# Patient Record
Sex: FEMALE | Race: WHITE | NOT HISPANIC OR LATINO
[De-identification: names, ages, dates, MRNs, and addresses within clinical notes are randomized per-mention and may not be internally consistent; named-entity substitution may affect disease eponyms.]

---

## 2017-11-27 ENCOUNTER — APPOINTMENT (OUTPATIENT)
Dept: SURGERY | Facility: CLINIC | Age: 37
End: 2017-11-27

## 2018-04-11 ENCOUNTER — APPOINTMENT (OUTPATIENT)
Dept: ORTHOPEDIC SURGERY | Facility: CLINIC | Age: 38
End: 2018-04-11

## 2021-04-29 ENCOUNTER — TRANSCRIPTION ENCOUNTER (OUTPATIENT)
Age: 41
End: 2021-04-29

## 2022-12-03 ENCOUNTER — NON-APPOINTMENT (OUTPATIENT)
Age: 42
End: 2022-12-03

## 2022-12-29 ENCOUNTER — APPOINTMENT (OUTPATIENT)
Dept: ORTHOPEDIC SURGERY | Facility: CLINIC | Age: 42
End: 2022-12-29

## 2023-02-01 ENCOUNTER — EMERGENCY (EMERGENCY)
Facility: HOSPITAL | Age: 43
LOS: 1 days | Discharge: HOME | End: 2023-02-01
Attending: STUDENT IN AN ORGANIZED HEALTH CARE EDUCATION/TRAINING PROGRAM | Admitting: STUDENT IN AN ORGANIZED HEALTH CARE EDUCATION/TRAINING PROGRAM
Payer: COMMERCIAL

## 2023-02-01 VITALS
HEIGHT: 65 IN | SYSTOLIC BLOOD PRESSURE: 138 MMHG | RESPIRATION RATE: 18 BRPM | DIASTOLIC BLOOD PRESSURE: 82 MMHG | HEART RATE: 102 BPM | OXYGEN SATURATION: 94 % | WEIGHT: 293 LBS | TEMPERATURE: 97 F

## 2023-02-01 VITALS
RESPIRATION RATE: 19 BRPM | HEART RATE: 74 BPM | OXYGEN SATURATION: 99 % | TEMPERATURE: 98 F | SYSTOLIC BLOOD PRESSURE: 116 MMHG | DIASTOLIC BLOOD PRESSURE: 67 MMHG

## 2023-02-01 LAB
ALBUMIN SERPL ELPH-MCNC: 3.2 G/DL — LOW (ref 3.3–5)
ALP SERPL-CCNC: 53 U/L — SIGNIFICANT CHANGE UP (ref 40–120)
ALT FLD-CCNC: 17 U/L — SIGNIFICANT CHANGE UP (ref 12–78)
ANION GAP SERPL CALC-SCNC: 5 MMOL/L — SIGNIFICANT CHANGE UP (ref 5–17)
APTT BLD: 35.6 SEC — HIGH (ref 27.5–35.5)
AST SERPL-CCNC: 14 U/L — LOW (ref 15–37)
BASOPHILS # BLD AUTO: 0.02 K/UL — SIGNIFICANT CHANGE UP (ref 0–0.2)
BASOPHILS NFR BLD AUTO: 0.3 % — SIGNIFICANT CHANGE UP (ref 0–2)
BILIRUB SERPL-MCNC: 0.4 MG/DL — SIGNIFICANT CHANGE UP (ref 0.2–1.2)
BUN SERPL-MCNC: 11 MG/DL — SIGNIFICANT CHANGE UP (ref 7–23)
CALCIUM SERPL-MCNC: 8.3 MG/DL — LOW (ref 8.5–10.1)
CHLORIDE SERPL-SCNC: 108 MMOL/L — SIGNIFICANT CHANGE UP (ref 96–108)
CO2 SERPL-SCNC: 27 MMOL/L — SIGNIFICANT CHANGE UP (ref 22–31)
CREAT SERPL-MCNC: 0.62 MG/DL — SIGNIFICANT CHANGE UP (ref 0.5–1.3)
EGFR: 114 ML/MIN/1.73M2 — SIGNIFICANT CHANGE UP
EOSINOPHIL # BLD AUTO: 0.09 K/UL — SIGNIFICANT CHANGE UP (ref 0–0.5)
EOSINOPHIL NFR BLD AUTO: 1.5 % — SIGNIFICANT CHANGE UP (ref 0–6)
GLUCOSE SERPL-MCNC: 82 MG/DL — SIGNIFICANT CHANGE UP (ref 70–99)
HADV DNA SPEC QL NAA+PROBE: DETECTED
HCG SERPL-ACNC: <1 MIU/ML — SIGNIFICANT CHANGE UP
HCT VFR BLD CALC: 38.6 % — SIGNIFICANT CHANGE UP (ref 34.5–45)
HGB BLD-MCNC: 12.9 G/DL — SIGNIFICANT CHANGE UP (ref 11.5–15.5)
HMPV RNA SPEC QL NAA+PROBE: DETECTED
IMM GRANULOCYTES NFR BLD AUTO: 0.3 % — SIGNIFICANT CHANGE UP (ref 0–0.9)
INR BLD: 1.12 RATIO — SIGNIFICANT CHANGE UP (ref 0.88–1.16)
LACTATE SERPL-SCNC: 0.7 MMOL/L — SIGNIFICANT CHANGE UP (ref 0.7–2)
LYMPHOCYTES # BLD AUTO: 0.97 K/UL — LOW (ref 1–3.3)
LYMPHOCYTES # BLD AUTO: 15.8 % — SIGNIFICANT CHANGE UP (ref 13–44)
MCHC RBC-ENTMCNC: 30.5 PG — SIGNIFICANT CHANGE UP (ref 27–34)
MCHC RBC-ENTMCNC: 33.4 GM/DL — SIGNIFICANT CHANGE UP (ref 32–36)
MCV RBC AUTO: 91.3 FL — SIGNIFICANT CHANGE UP (ref 80–100)
MONOCYTES # BLD AUTO: 0.42 K/UL — SIGNIFICANT CHANGE UP (ref 0–0.9)
MONOCYTES NFR BLD AUTO: 6.8 % — SIGNIFICANT CHANGE UP (ref 2–14)
NEUTROPHILS # BLD AUTO: 4.62 K/UL — SIGNIFICANT CHANGE UP (ref 1.8–7.4)
NEUTROPHILS NFR BLD AUTO: 75.3 % — SIGNIFICANT CHANGE UP (ref 43–77)
NRBC # BLD: 0 /100 WBCS — SIGNIFICANT CHANGE UP (ref 0–0)
PLATELET # BLD AUTO: 149 K/UL — LOW (ref 150–400)
POTASSIUM SERPL-MCNC: 3.6 MMOL/L — SIGNIFICANT CHANGE UP (ref 3.5–5.3)
POTASSIUM SERPL-SCNC: 3.6 MMOL/L — SIGNIFICANT CHANGE UP (ref 3.5–5.3)
PROT SERPL-MCNC: 7 G/DL — SIGNIFICANT CHANGE UP (ref 6–8.3)
PROTHROM AB SERPL-ACNC: 13.1 SEC — SIGNIFICANT CHANGE UP (ref 10.5–13.4)
RAPID RVP RESULT: DETECTED
RBC # BLD: 4.23 M/UL — SIGNIFICANT CHANGE UP (ref 3.8–5.2)
RBC # FLD: 14.5 % — SIGNIFICANT CHANGE UP (ref 10.3–14.5)
SARS-COV-2 RNA SPEC QL NAA+PROBE: SIGNIFICANT CHANGE UP
SODIUM SERPL-SCNC: 140 MMOL/L — SIGNIFICANT CHANGE UP (ref 135–145)
WBC # BLD: 6.14 K/UL — SIGNIFICANT CHANGE UP (ref 3.8–10.5)
WBC # FLD AUTO: 6.14 K/UL — SIGNIFICANT CHANGE UP (ref 3.8–10.5)

## 2023-02-01 PROCEDURE — 85025 COMPLETE CBC W/AUTO DIFF WBC: CPT

## 2023-02-01 PROCEDURE — 99284 EMERGENCY DEPT VISIT MOD MDM: CPT | Mod: 25

## 2023-02-01 PROCEDURE — 71045 X-RAY EXAM CHEST 1 VIEW: CPT

## 2023-02-01 PROCEDURE — 99285 EMERGENCY DEPT VISIT HI MDM: CPT

## 2023-02-01 PROCEDURE — 84702 CHORIONIC GONADOTROPIN TEST: CPT

## 2023-02-01 PROCEDURE — 80053 COMPREHEN METABOLIC PANEL: CPT

## 2023-02-01 PROCEDURE — 85610 PROTHROMBIN TIME: CPT

## 2023-02-01 PROCEDURE — 0225U NFCT DS DNA&RNA 21 SARSCOV2: CPT

## 2023-02-01 PROCEDURE — 93005 ELECTROCARDIOGRAM TRACING: CPT

## 2023-02-01 PROCEDURE — 94640 AIRWAY INHALATION TREATMENT: CPT

## 2023-02-01 PROCEDURE — 71045 X-RAY EXAM CHEST 1 VIEW: CPT | Mod: 26

## 2023-02-01 PROCEDURE — 85730 THROMBOPLASTIN TIME PARTIAL: CPT

## 2023-02-01 PROCEDURE — 87040 BLOOD CULTURE FOR BACTERIA: CPT

## 2023-02-01 PROCEDURE — 93010 ELECTROCARDIOGRAM REPORT: CPT

## 2023-02-01 PROCEDURE — 99285 EMERGENCY DEPT VISIT HI MDM: CPT | Mod: 25

## 2023-02-01 PROCEDURE — 36415 COLL VENOUS BLD VENIPUNCTURE: CPT

## 2023-02-01 PROCEDURE — 83605 ASSAY OF LACTIC ACID: CPT

## 2023-02-01 RX ORDER — FOLIC ACID 0.8 MG
1 TABLET ORAL
Qty: 0 | Refills: 0 | DISCHARGE

## 2023-02-01 RX ORDER — IPRATROPIUM/ALBUTEROL SULFATE 18-103MCG
3 AEROSOL WITH ADAPTER (GRAM) INHALATION
Refills: 0 | Status: COMPLETED | OUTPATIENT
Start: 2023-02-01 | End: 2023-02-01

## 2023-02-01 RX ORDER — ALBUTEROL 90 UG/1
1 AEROSOL, METERED ORAL ONCE
Refills: 0 | Status: COMPLETED | OUTPATIENT
Start: 2023-02-01 | End: 2023-02-01

## 2023-02-01 RX ORDER — FLUOXETINE HCL 10 MG
2 CAPSULE ORAL
Qty: 0 | Refills: 0 | DISCHARGE

## 2023-02-01 RX ORDER — ACETAMINOPHEN 500 MG
650 TABLET ORAL ONCE
Refills: 0 | Status: COMPLETED | OUTPATIENT
Start: 2023-02-01 | End: 2023-02-01

## 2023-02-01 RX ORDER — SODIUM CHLORIDE 9 MG/ML
1750 INJECTION INTRAMUSCULAR; INTRAVENOUS; SUBCUTANEOUS ONCE
Refills: 0 | Status: COMPLETED | OUTPATIENT
Start: 2023-02-01 | End: 2023-02-01

## 2023-02-01 RX ORDER — LEVOTHYROXINE SODIUM 125 MCG
1 TABLET ORAL
Qty: 0 | Refills: 0 | DISCHARGE

## 2023-02-01 RX ORDER — FERROUS SULFATE 325(65) MG
325 TABLET ORAL
Qty: 0 | Refills: 0 | DISCHARGE

## 2023-02-01 RX ADMIN — Medication 3 MILLILITER(S): at 21:24

## 2023-02-01 RX ADMIN — SODIUM CHLORIDE 1750 MILLILITER(S): 9 INJECTION INTRAMUSCULAR; INTRAVENOUS; SUBCUTANEOUS at 21:02

## 2023-02-01 RX ADMIN — ALBUTEROL 1 PUFF(S): 90 AEROSOL, METERED ORAL at 23:19

## 2023-02-01 RX ADMIN — Medication 650 MILLIGRAM(S): at 21:03

## 2023-02-01 RX ADMIN — Medication 3 MILLILITER(S): at 21:52

## 2023-02-01 NOTE — ED PROVIDER NOTE - CLINICAL SUMMARY MEDICAL DECISION MAKING FREE TEXT BOX
here with fever, body aches, cough. on Augmenting for sinus infection. check labs, treat symptoms, viral swab, XR chest, re-evaluation.

## 2023-02-01 NOTE — ED PROVIDER NOTE - PATIENT PORTAL LINK FT
You can access the FollowMyHealth Patient Portal offered by [facility] by registering at the following website: [email or web address]. By joining [facility]’s FollowMyHealth portal, you will also be able to view your health information using other applications (apps) compatible with our system.

## 2023-02-01 NOTE — ED PROVIDER NOTE - NSICDXPASTSURGICALHX_GEN_ALL_CORE_FT
PAST SURGICAL HISTORY:  Acute ankle pain fracture repair right ankle 2x 2013 with screws /pins implant  Nov/July    History of obesity laparoscopic gastric banding 2002  removal of lap band  2005

## 2023-02-01 NOTE — ED ADULT TRIAGE NOTE - CHIEF COMPLAINT QUOTE
Fever x 4 days, cough, body aches, headache, dizziness, SOB, tested negative for flu, covid and RSV on Monday.  .

## 2023-02-01 NOTE — ED ADULT NURSE NOTE - OBJECTIVE STATEMENT
pt sent by PMD, fever chills cough since the "weekend" reports negative RVP done by PMD Monday. On augmentin, C/o persistent fevers, last Tylenol taken around 5 pm today.

## 2023-02-01 NOTE — ED PROVIDER NOTE - NSFOLLOWUPINSTRUCTIONS_ED_ALL_ED_FT
Drink plenty of fluids  return to er for any worsening symptoms                                                                           Viral Illness, Adult      Viruses are tiny germs that can get into a person's body and cause illness. There are many different types of viruses, and they cause many types of illness. Viral illnesses can range from mild to severe. They can affect various parts of the body.    Short-term conditions that are caused by a virus include colds and the flu (influenza). Long-term conditions that are caused by a virus include herpes, shingles, and HIV (human immunodeficiency virus) infection. A few viruses have been linked to certain cancers.      What are the causes?    Many types of viruses can cause illness. Viruses invade cells in your body, multiply, and cause the infected cells to work abnormally or die. When these cells die, they release more of the virus. When this happens, you develop symptoms of the illness, and the virus continues to spread to other cells. If the virus takes over the function of the cell, it can cause the cell to divide and grow out of control. This happens when a virus causes cancer.    Different viruses get into the body in different ways. You can get a virus by:  •Swallowing food or water that has come in contact with the virus (is contaminated).      •Breathing in droplets that have been coughed or sneezed into the air by an infected person.      •Touching a surface that has been contaminated with the virus and then touching your eyes, nose, or mouth.      •Being bitten by an insect or animal that carries the virus.      •Having sexual contact with a person who is infected with the virus.      •Being exposed to blood or fluids that contain the virus, either through an open cut or during a transfusion.      If a virus enters your body, your body's defense system (immune system) will try to fight the virus. You may be at higher risk for a viral illness if your immune system is weak.      What are the signs or symptoms?    You may have these symptoms, depending on the type of virus and the location of the cells that it invades:•Cold and flu viruses:  •Fever.      •Headache.      •Sore throat.      •Muscle aches.      •Stuffy nose (nasal congestion).      •Cough.      •Digestive system (gastrointestinal) viruses:  •Fever.      •Pain in the abdomen.      •Nausea.      •Diarrhea.      •Liver viruses (hepatitis):  •Loss of appetite.      •Tiredness.      •Skin or the white parts of your eyes turning yellow (jaundice).      •Brain and spinal cord viruses:  •Fever.      •Headache.      •Stiff neck.      •Nausea and vomiting.      •Confusion or sleepiness.      •Skin viruses:  •Warts.      •Itching.      •Rash.      •Sexually transmitted viruses:  •Discharge.      •Swelling.      •Redness.      •Rash.          How is this diagnosed?    This condition may be diagnosed based on one or more of the following:  •Symptoms.      •Medical history.      •Physical exam.      •Blood test, sample of mucus from your lungs (sputum sample), stool sample, or a swab of body fluids or a skin sore (lesion).        How is this treated?    Viruses can be hard to treat because they live within cells. Antibiotic medicines do not treat viruses because these medicines do not get inside cells. Treatment for a viral illness may include:  •Resting and drinking plenty of fluids.      •Medicines to relieve symptoms. These can include over-the-counter medicine for pain and fever, medicines for cough or congestion, and medicines to relieve diarrhea.      •Antiviral medicines. These medicines are available only for certain types of viruses.      Some viral illnesses can be prevented with vaccinations. A common example is the flu shot.      Follow these instructions at home:    Medicines     •Take over-the-counter and prescription medicines only as told by your health care provider.      •If you were prescribed an antiviral medicine, take it as told by your health care provider. Do not stop taking the antiviral even if you start to feel better.    •Be aware of when antibiotics are needed and when they are not needed. Antibiotics do not treat viruses. You may get an antibiotic if your health care provider thinks that you may have, or are at risk for, a bacterial infection and you have a viral infection.  •Do not ask for an antibiotic prescription if you have been diagnosed with a viral illness. Antibiotics will not make your illness go away faster.      •Frequently taking antibiotics when they are not needed can lead to antibiotic resistance. When this develops, the medicine no longer works against the bacteria that it normally fights.          General instructions      •Drink enough fluids to keep your urine pale yellow.      •Rest as much as possible.      •Return to your normal activities as told by your health care provider. Ask your health care provider what activities are safe for you.      •Keep all follow-up visits as told by your health care provider. This is important.        How is this prevented?     To reduce your risk of viral illness:  •Wash your hands often with soap and water for at least 20 seconds. If soap and water are not available, use hand .      •Avoid touching your nose, eyes, and mouth, especially if you have not washed your hands recently.      •If anyone in your household has a viral infection, clean all household surfaces that may have been in contact with the virus. Use soap and hot water. You may also use bleach that you have added water to (diluted).      •Stay away from people who are sick with symptoms of a viral infection.      •Do not share items such as toothbrushes and water bottles with other people.      •Keep your vaccinations up to date. This includes getting a yearly flu shot.      •Eat a healthy diet and get plenty of rest.        Contact a health care provider if:    •You have symptoms of a viral illness that do not go away.      •Your symptoms come back after going away.      •Your symptoms get worse.        Get help right away if you have:    •Trouble breathing.      •A severe headache or a stiff neck.      •Severe vomiting or pain in your abdomen.      These symptoms may represent a serious problem that is an emergency. Do not wait to see if the symptoms will go away. Get medical help right away. Call your local emergency services (911 in the [place] Do not drive yourself to the hospital.       Summary    •Viruses are types of germs that can get into a person's body and cause illness. Viral illnesses can range from mild to severe. They can affect various parts of the body.      •Viruses can be hard to treat. There are medicines to relieve symptoms, and there are some antiviral medicines.      •If you were prescribed an antiviral medicine, take it as told by your health care provider. Do not stop taking the antiviral even if you start to feel better.      •Contact a health care provider if you have symptoms of a viral illness that do not go away.      This information is not intended to replace advice given to you by your health care provider. Make sure you discuss any questions you have with your health care provider.      Document Revised: 05/03/2021 Document Reviewed: 10/27/2020    [employer] Patient Education © 2022 [employer]

## 2023-02-01 NOTE — ED PROVIDER NOTE - INTERNATIONAL TRAVEL
Goal Outcome Evaluation:  Plan of Care Reviewed With: patient  Progress: improving  Outcome Summary: Pt required Max encouragement to participate with PT this morning. Pt finally agreed. Pt is ModA with bed mobility. Pt performed a squat pivot from bed to w/c with ModAX2. Saw pt again this afternoon to assist nsg staff with getting pt cleaned up. Pt refused to get back into bed and insisted on standing. Pt performed 3 sit<->stand transfers with Max assist of 2. Pt unable to barely clear bottom off chair. Pt able to clear bottom off chair enough for nsg aid to clean pt. Will continue to progress pt as tolerated.  ..Patient was wearing a face mask during this therapy encounter. Therapist used appropriate personal protective equipment including eye protection, mask, and gloves.  Mask used was standard procedure mask. Appropriate PPE was worn during the entire therapy session. Hand hygiene was completed before and after therapy session. Patient is not in enhanced droplet precautions.   PT Tech present:Becca   No

## 2023-02-01 NOTE — ED PROVIDER NOTE - NSICDXPASTMEDICALHX_GEN_ALL_CORE_FT
PAST MEDICAL HISTORY:  Depression on medication    Morbid obesity with body mass index of 70 and over in adult had laparoscopic banding 2002/ failed removal of banding 2005    Sleep apnea on CPAP machine

## 2023-02-01 NOTE — ED PROVIDER NOTE - NS ED ATTENDING STATEMENT MOD
This was a shared visit with the SHAMIKA. I reviewed and verified the documentation and independently performed the documented:

## 2023-02-01 NOTE — ED PROVIDER NOTE - OBJECTIVE STATEMENT
Patient is a 42-year-old female with past medical history of depression status post gastric banding fever chills body aches cough headache dizziness for the last 4 days.  Patient dates tested negative for flu and COVID as well as RSV.  Patient is a .  Patient went back to PCP and prescribed Augmentin for sinus infection since everything negative.  Patient states taking Tylenol and still having fever.  Patient denies any abdominal pain nausea vomiting diarrhea or urinary symptoms.

## 2023-02-01 NOTE — ED PROVIDER NOTE - ATTENDING APP SHARED VISIT CONTRIBUTION OF CARE
This was a shared visit with SHAMIKA. I reviewed and verified the documentation and independently performed the documented MDM.

## 2023-02-01 NOTE — ED PROVIDER NOTE - CCCP TRG CHIEF CMPLNT
multiple medical complaints Topical Sulfur Applications Pregnancy And Lactation Text: This medication is Pregnancy Category C and has an unknown safety profile during pregnancy. It is unknown if this topical medication is excreted in breast milk.

## 2023-02-07 LAB
CULTURE RESULTS: SIGNIFICANT CHANGE UP
CULTURE RESULTS: SIGNIFICANT CHANGE UP
SPECIMEN SOURCE: SIGNIFICANT CHANGE UP
SPECIMEN SOURCE: SIGNIFICANT CHANGE UP

## 2023-05-17 ENCOUNTER — APPOINTMENT (OUTPATIENT)
Dept: PULMONOLOGY | Facility: CLINIC | Age: 43
End: 2023-05-17

## 2023-08-09 ENCOUNTER — APPOINTMENT (OUTPATIENT)
Dept: PULMONOLOGY | Facility: CLINIC | Age: 43
End: 2023-08-09
Payer: COMMERCIAL

## 2023-08-09 VITALS
DIASTOLIC BLOOD PRESSURE: 72 MMHG | SYSTOLIC BLOOD PRESSURE: 118 MMHG | HEIGHT: 65 IN | TEMPERATURE: 97.6 F | HEART RATE: 63 BPM | BODY MASS INDEX: 48.82 KG/M2 | RESPIRATION RATE: 16 BRPM | OXYGEN SATURATION: 98 % | WEIGHT: 293 LBS

## 2023-08-09 DIAGNOSIS — Z78.9 OTHER SPECIFIED HEALTH STATUS: ICD-10-CM

## 2023-08-09 DIAGNOSIS — Z83.3 FAMILY HISTORY OF DIABETES MELLITUS: ICD-10-CM

## 2023-08-09 DIAGNOSIS — J45.909 UNSPECIFIED ASTHMA, UNCOMPLICATED: ICD-10-CM

## 2023-08-09 DIAGNOSIS — Z72.820 SLEEP DEPRIVATION: ICD-10-CM

## 2023-08-09 DIAGNOSIS — R06.02 SHORTNESS OF BREATH: ICD-10-CM

## 2023-08-09 DIAGNOSIS — J32.9 CHRONIC SINUSITIS, UNSPECIFIED: ICD-10-CM

## 2023-08-09 DIAGNOSIS — E66.01 MORBID (SEVERE) OBESITY DUE TO EXCESS CALORIES: ICD-10-CM

## 2023-08-09 PROCEDURE — 94010 BREATHING CAPACITY TEST: CPT

## 2023-08-09 PROCEDURE — 94727 GAS DIL/WSHOT DETER LNG VOL: CPT

## 2023-08-09 PROCEDURE — 99204 OFFICE O/P NEW MOD 45 MIN: CPT | Mod: 25

## 2023-08-09 PROCEDURE — 94729 DIFFUSING CAPACITY: CPT

## 2023-08-09 PROCEDURE — ZZZZZ: CPT

## 2023-08-09 PROCEDURE — 94618 PULMONARY STRESS TESTING: CPT

## 2023-08-09 RX ORDER — ALBUTEROL SULFATE 90 UG/1
108 (90 BASE) INHALANT RESPIRATORY (INHALATION)
Qty: 1 | Refills: 1 | Status: ACTIVE | COMMUNITY
Start: 2023-08-09 | End: 1900-01-01

## 2023-08-09 RX ORDER — BUDESONIDE AND FORMOTEROL FUMARATE DIHYDRATE 160; 4.5 UG/1; UG/1
160-4.5 AEROSOL RESPIRATORY (INHALATION) TWICE DAILY
Qty: 1 | Refills: 1 | Status: ACTIVE | COMMUNITY
Start: 2023-08-09 | End: 1900-01-01

## 2023-08-09 RX ORDER — ALBUTEROL SULFATE 90 UG/1
108 (90 BASE) INHALANT RESPIRATORY (INHALATION)
Refills: 0 | Status: ACTIVE | COMMUNITY
Start: 1900-01-01 | End: 1900-01-01

## 2023-08-09 NOTE — REASON FOR VISIT
[Initial] : an initial visit [Wheezing] : wheezing [Cough] : cough [Shortness of Breath] : shortness of breath

## 2023-08-15 PROBLEM — J45.909 RAD (REACTIVE AIRWAY DISEASE): Status: ACTIVE | Noted: 2023-08-15

## 2023-08-15 PROBLEM — Z72.820 POOR SLEEP: Status: ACTIVE | Noted: 2023-08-15

## 2023-08-15 PROBLEM — Z83.3 FAMILY HISTORY OF DIABETES MELLITUS: Status: ACTIVE | Noted: 2023-08-09

## 2023-08-15 NOTE — REVIEW OF SYSTEMS
[Fatigue] : fatigue [Recent Wt Loss (___ Lbs)] : ~T recent [unfilled] lb weight loss [Postnasal Drip] : postnasal drip [Sinus Problems] : sinus problems [Cough] : cough [Wheezing] : wheezing [SOB on Exertion] : sob on exertion [Obesity] : obesity [Negative] : Psychiatric [Fever] : no fever [Recent Wt Gain (___ Lbs)] : ~T no recent weight gain [EDS] : no EDS [Chills] : no chills [Poor Appetite] : no poor appetite [Dry Eyes] : no dry eyes [Ear Disturbance] : no ear disturbance [Epistaxis] : no epistaxis [Sore Throat] : no sore throat [Eye Irritation] : no eye irritation [Nasal Congestion] : no nasal congestion [Dry Mouth] : no dry mouth [Mouth Ulcers] : no mouth ulcers [Poor Dentition] : no poor dentition [Edentulous] : no edentulous [Hemoptysis] : no hemoptysis [Chest Tightness] : no chest tightness [Frequent URIs] : no frequent URIs [Sputum] : no sputum [Dyspnea] : no dyspnea [Pleuritic Pain] : no pleuritic pain [A.M. Dry Mouth] : no a.m. dry mouth [Diabetes] : no diabetes [Thyroid Problem] : no thyroid problem

## 2023-08-15 NOTE — PROCEDURE
[FreeTextEntry1] : 6MWT Performed in office shows: RA SPO2 @ REST: 96 RA SPO2 @ EXERTION:90  PFTs completed today for shortness of breath.  PFT'S performed in office show mild obstructive lung defect.  Diffusion capacity within normal. FEV: 119 FEV1/FVC Ratio: 72 ZSC28-10%: 76 DLCO: 85

## 2023-08-15 NOTE — HISTORY OF PRESENT ILLNESS
[TextBox_4] : Ms. Nguyễn is 43 year old female with history of chronic sinusitis, morbid obesity s/p lap band and gastric surgery who now presents to initial pulmonary evaluation.  Her chief concern is shortness of breath on exertion.  Patient reports for the past 2 to 3 months on and off she is experiencing shortness of breath with exertion, dry cough, wheezing and voice hoarseness.  She reports she has history of chronic sinus infections.  Patient states usually with sinus infection she will also experience wheezing, and shortness of breath.  Patient states she is prescribed albuterol inhaler or albuterol via nebulizer which resolves the breathing issues.  Patient had chest x-ray in the past which were all normal.  She denies any imaging in the last 2 to 3 months. Patient denies any pulmonary evaluation.  Patient reports history of PNA once. Patient reports she had ENT evaluation for chronic sinus infection and had CT of the sinuses.  She states she has deviated septum and narrow sinuses.  Patient was recommended to do sinus rinses daily which she is not compliant. Patient reports she has seen an allergist/immunologist in the last year and all testing were negative. Patient reports she lost 90 pounds in the last 1 year with diet and yoga.  She states she had surgeries for weight loss in the past.  Patient states she would lose the weight but has gained it back.  Patient reports poor sleep.  She admits to nonrestorative sleep, multiple night awakenings and occasional daytime sleepiness.  Patient states she had sleep study as part of the pulmonary clearance in the past and was told she has sleep apnea.  She states she never got any treatment for it.  Patient denies snoring, witnessed apneas or sleepy driving.  Patient reports COVID infection in 2022 with mild symptoms.  She is COVID vaccinated.  Patient denies fever, chills, SOB @ rest, nasal congestion, runny nose, or heartburn/reflux.  Denies smoking. Reports social alcohol use. Denies known exposure to chemicals or irritants or asbestosis.  ENT Dr. Whiteside Allergy/immunologist Dr. Wolf

## 2023-08-15 NOTE — ASSESSMENT
[FreeTextEntry1] : Ms. Nguyễn is 43 year old female with history of chronic sinusitis, morbid obesity s/p lap band and gastric surgery who now presents to initial pulmonary evaluation.  Given 6-minute walk and PFTs, shortness of breath and wheezing likely related to RAD due to chronic sinus infection and weight.  Will trial Symbicort inhaler for 1 month.  Will do chest x-ray for completeness and labs. Given patient's sleep symptoms, recommended sleep study patient would like to defer at this time.  1.SOB on exertion, & wheezing -likely r/t mild asthma and weight -see treatment for asthma/RAD -add CXR (list of [facility] imaging center given to patient) -add Allergy Labs (list of [facility] labs given to patient)  2.RAD -Continue albuterol inhaler 2 puffs every 6 hours as needed -Add Symbicort 160 2 puffs twice daily: Rinse and gargle after use  3.  Morbid obesity -Continue yoga -Recommended evaluation by weight loss surgeon  4.  Poor sleep -Add HST r/o PATRICIA (pt deferred)  5. Chronic sinusitis -recommended sinus rinse  Patient to follow up in 4-6 weeks. Patient to call with further questions and concerns. Patient verbalizes understanding of care plan and is agreeable.

## 2023-09-13 ENCOUNTER — RX RENEWAL (OUTPATIENT)
Age: 43
End: 2023-09-13

## 2023-09-13 ENCOUNTER — APPOINTMENT (OUTPATIENT)
Dept: PULMONOLOGY | Facility: CLINIC | Age: 43
End: 2023-09-13

## 2023-09-20 ENCOUNTER — RX RENEWAL (OUTPATIENT)
Age: 43
End: 2023-09-20

## 2024-04-08 NOTE — ED ADULT NURSE NOTE - SUICIDE SCREENING DEPRESSION
[FreeTextEntry1] : X-rays of the left hand and wrist are performed and are grossly negative for Negative

## 2024-08-25 ENCOUNTER — NON-APPOINTMENT (OUTPATIENT)
Age: 44
End: 2024-08-25

## 2024-11-18 ENCOUNTER — NON-APPOINTMENT (OUTPATIENT)
Age: 44
End: 2024-11-18

## 2025-05-16 ENCOUNTER — NON-APPOINTMENT (OUTPATIENT)
Age: 45
End: 2025-05-16

## 2025-05-27 ENCOUNTER — NON-APPOINTMENT (OUTPATIENT)
Age: 45
End: 2025-05-27